# Patient Record
Sex: FEMALE | Race: BLACK OR AFRICAN AMERICAN
[De-identification: names, ages, dates, MRNs, and addresses within clinical notes are randomized per-mention and may not be internally consistent; named-entity substitution may affect disease eponyms.]

---

## 2020-01-22 ENCOUNTER — HOSPITAL ENCOUNTER (EMERGENCY)
Dept: HOSPITAL 74 - FER | Age: 20
Discharge: HOME | End: 2020-01-22
Payer: COMMERCIAL

## 2020-01-22 VITALS — TEMPERATURE: 98.5 F | HEART RATE: 75 BPM | SYSTOLIC BLOOD PRESSURE: 130 MMHG | DIASTOLIC BLOOD PRESSURE: 65 MMHG

## 2020-01-22 VITALS — BODY MASS INDEX: 37.1 KG/M2
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
## 2020-01-22 DIAGNOSIS — Z91.018: ICD-10-CM

## 2020-01-22 DIAGNOSIS — Z91.013: ICD-10-CM

## 2020-01-22 DIAGNOSIS — S61.213A: Primary | ICD-10-CM

## 2020-01-22 PROCEDURE — 0HQGXZZ REPAIR LEFT HAND SKIN, EXTERNAL APPROACH: ICD-10-PCS

## 2020-01-22 NOTE — PDOC
*Physical Exam





- Vital Signs


 Last Vital Signs











Temp Pulse Resp BP Pulse Ox


 


 98.5 F   75   18   130/65   99 


 


 01/22/20 21:01  01/22/20 21:01  01/22/20 21:01  01/22/20 21:01  01/22/20 21:01














ED Progress Note





- Progress Note


Progress Note: 





Care of this patient received from Dr. Jules.


This 19-year-old woman presents with injury to the distal phalanx of her left 

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 

is employed.  While using a sharp knife preparing food, the patient sustained a 

flap type laceration of the radial aspect of the distal phalanx.  Injury is 

nearly full  avulsion of tissue with flap being held on by thin slip of skin.  

Flap, however seems viable on exam.


Left index finger x-ray was performed to evaluate for bony injury: Preliminary 

interpretationno evidence of fracture or other bony injury.


 


Dr. Jules had performed a digital anesthesia block on the finger prior to 

my assuming care of the patient.





Patient had excellent anesthesia of the distal phalanx of the left index finger.


Wound cleansed with Hibiclens/ethanol solution and sterilely draped.  Wound was 

irrigated with 30 mL of sterile normal saline.


Laceration was closed using 8 interrupted sutures of 6-0 nylon.  Skin flap was 

slightly pale at completion of the repair.


Bacitracin ointment and sterile dressing was applied followed by a protective 

gauze tube dressing.  Patient tolerated procedure well





Because of the complex nature of this injury, patient will be started on Keflex 

500 mg 3 times a day for 5 days.  First dose given now in the ER.





Patient was advised that the flap may not ultimately viable but would act as a 

biological dressing at the very least.  She will require a wound check in the 

next few days.  She should not work until that time.  Work documentation will 

be provided for the patient.


Case was discussed with Dr. Goldberg by phone: He will see the patient in his 

office within the next 2 to 3 days.


Meanwhile, the patient has increased pain or swelling in the finger, she should 

return to the ER





Discharge





- Discharge Information


Problems reviewed: Yes


Clinical Impression/Diagnosis: 


Finger laceration


Qualifiers:


 Encounter type: initial encounter Finger: index finger Damage to nail status: 

with damage Foreign body presence: without foreign body Laterality: left 

Qualified Code(s): S61.311A - Laceration without foreign body of left index 

finger with damage to nail, initial encounter





Condition: Stable


Disposition: HOME





- Additional Discharge Information


Prescriptions: 


Cephalexin [Keflex] 500 mg PO TID #15 capsule





- Follow up/Referral


Referrals: 


ON STAFF,NOT [Primary Care Provider] - 


Goldberg,Neal D, MD [Staff Physician] - Call tomorrow





- Patient Discharge Instructions


Patient Printed Discharge Instructions:  How to Care for a Laceration After 

Repair


Additional Instructions: 


Keep original dressing in place and as dry as possible until seen by Dr. Goldberg


Elevate left hand at heart level or above as much as possible


Tylenol/Motrin/Aleve as needed for pain


Keflex 500mg 3X day for 5 days


Call Dr Goldberg's office tomorrow AM to arrange followup appointment within 2-

3 days


No work until seen by Dr Goldberg


Return to ER if you have persistent pain or finger becomes swollen





- Post Discharge Activity


Work/Back to School Note:  Back to Work

## 2020-01-26 ENCOUNTER — HOSPITAL ENCOUNTER (EMERGENCY)
Dept: HOSPITAL 74 - FER | Age: 20
Discharge: HOME | End: 2020-01-26
Payer: COMMERCIAL

## 2020-01-26 VITALS — DIASTOLIC BLOOD PRESSURE: 82 MMHG | HEART RATE: 82 BPM | TEMPERATURE: 97.6 F | SYSTOLIC BLOOD PRESSURE: 133 MMHG

## 2020-01-26 VITALS — BODY MASS INDEX: 36 KG/M2

## 2020-01-26 DIAGNOSIS — Z48.00: Primary | ICD-10-CM

## 2020-01-26 NOTE — PDOC
Documentation entered by Lucy Ellison SCRIBE, acting as scribe for 

Lilly Garcia MD.








Lilly Garcia MD:  This documentation has been prepared by the 

brittibe, Lucy Ellison SCRIBE, under my direction and personally reviewed by 

me in its entirety.  I confirm that the documentation accurately reflects all 

work, treatment, procedures, and medical decision making performed by me.  





Suture Removal/Wound Check HPI





- History of Present Illness


Chief Complaint: Wound


Stated Complaint: WOUND CHECK


History Source: Yes: Patient


Exam Limitations: Yes: No Limitations


Treated at: [facility] ED





- Previous ED Treatment


Type of procedure performed on last visit: Yes: Laceration Repair





- Onset of Previous Treatment


Comment:: 





01/26/20 20:07


Assessment and plan: This is a 19-year-old female who comes in for a wound 

check.  Patient had sutures placed about 3 days ago.  Patient did have a small 

amount of gauze that was stuck to the wound that was





Otherwise wound is healing well there is no evidence of infection.  Patient 

discharged told to return 1 week after the sutures were placed for suture 

removal





Past History





- Travel


Traveled outside of the country in the last 30 days: No


Close contact w/someone who was outside of country & ill: No





- Past Medical History


Allergies/Adverse Reactions: 


 Allergies











Allergy/AdvReac Type Severity Reaction Status Date / Time


 


sesame seed Allergy Intermediate Swelling Verified 01/26/20 19:49


 


shellfish derived Allergy Intermediate Swelling Verified 01/26/20 19:49











Home Medications: 


Ambulatory Orders





Cephalexin [Keflex] 500 mg PO TID #15 capsule 01/22/20 








COPD: No





- Psycho Social/Smoking Cessation Hx


Smoking History: Current every day smoker


Have you smoked in the past 12 months: Yes


Information on smoking cessation initiated: Yes


Hx Alcohol Use: No


Drug/Substance Use Hx: No





Suture Removal/Wound Check PE





- Physical Exam


Laceration/Wound Check Symptoms: reports: Improved


Current Severity Level: None


Maximum Severity Level: None


Pain Localization: None


Location of Laceration/Wound: right: Finger (Middle finger)


Pain Radiation: None





*Review of Systems





- Review of Systems


Constitutional: No: Symptoms Reported, See HPI, Chills, Diaphoresis, Fever, 

Loss of Appetite, Malaise, Night Sweats, Weakness, Weight Stable, Unintentional 

Wgt. Loss, Unexplained wgt Loss, Other


HEENTM: No: Symptoms Reported, See HPI, Eye Pain, Blurred Vision, Tearing, 

Recent change in vision, Double Vision, Cataracts, Ear Pain, Ocular Prothesis, 

Ear Discharge, Nose Pain, Nose Congestion, Tinnitus, Nose Bleeding, Hearing Loss

, Throat Pain, Throat Swelling, Mouth Pain, Dental Problems, Difficulty 

Swallowing, Mouth Swelling, Other


Respiratory: No: Symptoms reported, See HPI, Cough, Orthopnea, Shortness of 

Breath, SOB with Exertion, SOB at Rest, Stridor, Wheezing, Productive cough, 

Hemoptysis, Other


Cardiac (ROS): No: Symptoms Reported, See HPI, Chest Pain, Edema, Irregular 

Heart Rate, Lightheadedness, Palpitations, Syncope, Chest Tightness, Other


ABD/GI: No: Symptoms Reported, See HPI, Abdominal Distended, Abd. Pain w/ 

defecation, Blood Streaked Bowels, Constipated, Diarrhea, Difficulty Swallowing

, Nausea, Poor Appetite, Poor Fluid Intake, Rectal Bleeding, Vomiting, 

Indigestion, Abdominal cramping, Tarry Stools, Other


Musculoskeletal: No: Symptoms Reported, See HPI, Back Pain, Gout, Joint Pain, 

Joint Swelling, Muscle Pain, Muscle Weakness, Neck Pain, Joint Stiffness, Other


Integumentary: No: Symptoms Reported, See HPI, Bruising, Change in Color, 

Change in Hair/Nails, Dryness, Erythema, Flushing, Lesions, Lumps, Pallor, 

Pruritus, Rash, Sweating, Other


Neurological: No: Symptoms reported, See HPI, Headache, Numbness, Paresthesia, 

Pre-Existing Deficit, Seizure, Tingling, Tremors, Weakness, Unsteady Gait, 

Ataxia, Dizziness, Other


Psychiatric: No: Anxiety, Depression, Frequent Crying, Stressors, Sleep Pattern 

Change, Emotional Problems, Mood Swings, Change in Appetite, Other


Endocrine: No: Symptoms Reported, See HPI, Excessive Sweating, Flushing, 

Intolerance to Cold, Intolerance to Heat, Increased Hunger, Increased Thirst, 

Increased Urine, Unexplained Weight Gain, Unexplained Weight Loss, Change in 

Weight, Other





*Physical Exam





- Vital Signs


 Last Vital Signs











Temp Pulse Resp BP Pulse Ox


 


 97.6 F   82   16   133/82   100 


 


 01/26/20 19:51  01/26/20 19:51  01/26/20 19:51  01/26/20 19:51  01/26/20 19:51














Discharge





- Discharge Information


Problems reviewed: Yes


Clinical Impression/Diagnosis: 


 Visit for wound check





Condition: Stable





- Admission


No





- Follow up/Referral





- Patient Discharge Instructions


Additional Instructions: 


Clean the area once a day with a little peroxide reapply some bacitracin and 

can cover with a Band-Aid if working in a dirty environment.





Keep the finger dry.





Return to the emergency department immediately with ANY new, persistent or 

worsening symptoms.





Continue any medications as previously prescribed by your physician.





You should follow up with your primary doctor as soon as possible regarding 

today's emergency department visit.


.


Please make sure your doctor reviews the results of your emergency evaluation.





Thank you for coming to the   Emergency Department today for your care. It was 

a pleasure to see you today. Please note that your evaluation is INCOMPLETE 

until you  follow-up with your doctor. 





- Post Discharge Activity

## 2020-02-03 NOTE — PDOC
Documentation entered by Niurka Swain SCRIBE, acting as scribe for Mike Nava MD.








Mike Nava MD:  This documentation has been prepared by the brittibeWiliam Lincy, SCRIBE, under my direction and personally reviewed by me in its 

entirety.  I confirm that the documentation accurately reflects all work, 

treatment, procedures, and medical decision making performed by me.  





History of Present Illness





- General


Chief Complaint: Injury


Stated Complaint: CUT  LEFT  MID FINGER AT HOME ON KNIFE


Time Seen by Provider: 01/22/20 21:02


History Source: Patient


Exam Limitations: No Limitations





- History of Present Illness


Initial Comments: 


01/22/20 21:39


The patient is a 19-year-old female with no reported past medical history who 

presents to the emergency department with a cut to the left middle finger. The 

patient reports she was cutting chicken when she accidentally cut her left 

middle finger. The patient reports incident occurs around 8:00-8:15 pm. Denies 

numbness, tingling, or loss of sensation. 





Allergies: sesame seed and shellfish derived. 





Review of system: 


CONSTITUTIONAL:


Absent: fever, no chills, no fatigue


EYES:


Absent: visual changes


ENT:


Absent: ear pain, no sore throat


CARDIOVASCULAR:


Absent: chest pain, no palpitations


RESPIRATORY:


Absent: cough, no SOB


GI:


Absent: abdominal pain, no nausea, no vomiting, no constipation, no diarrhea


GENITOURINARY:


Absent: dysuria, no frequency, no hematuria


MUSKULOSKELETAL: +cut to left middle finger. 


Absent: back pain, no arthralgia, no myalgia


SKIN:


Absent: rash


NEURO:


Absent: headache. 





Physical exam: 


GENERAL: Well developed, well nourished. Awake and alert. No acute distress.


EXTREMITY: Partial avulsion of the left 3rd finger, approximately 5 mm in 

diameter, consisting of the skin and soft tissue only. Involves sliver of the  

nail edge, Good coverage of the bone, no exposed bone. 





Digital block with 1% lidocaine plain administered for pain control and to 

facilitate repair





X-ray ordered





Signed out to Dr. Arrieta 10 PM for further evaluation and definitive treatment.





Past History





- Past Medical History


Allergies/Adverse Reactions: 


 Allergies











Allergy/AdvReac Type Severity Reaction Status Date / Time


 


sesame seed Allergy Intermediate Swelling Verified 01/26/20 19:49


 


shellfish derived Allergy Intermediate Swelling Verified 01/26/20 19:49











Home Medications: 


Ambulatory Orders





Cephalexin [Keflex] 500 mg PO TID #15 capsule 01/22/20 











*Physical Exam





- Vital Signs


 Last Vital Signs











Temp Pulse Resp BP Pulse Ox


 


 98.5 F   75   18   130/65   99 


 


 01/22/20 21:01  01/22/20 21:01  01/22/20 21:01  01/22/20 21:01  01/22/20 21:01














ED Treatment Course





- RADIOLOGY


Radiology Studies Ordered: 














 Category Date Time Status


 


 FINGER(S) LEFT [RAD] Stat Radiology  01/22/20 21:49 Completed














- Medications


Given in the ED: 


ED Medications














Discontinued Medications














Generic Name Dose Route Start Last Admin





  Trade Name Azra  PRN Reason Stop Dose Admin


 


Cephalexin HCl  500 mg  01/22/20 22:52  01/22/20 23:17





  Keflex -  PO  01/22/20 22:53  500 mg





  ONCE ONE   Administration





     





     





     





     














Discharge





- Discharge Information


Problems reviewed: Yes


Clinical Impression/Diagnosis: 


Finger laceration


Qualifiers:


 Encounter type: initial encounter Finger: index finger Damage to nail status: 

with damage Foreign body presence: without foreign body Laterality: left 

Qualified Code(s): S61.311A - Laceration without foreign body of left index 

finger with damage to nail, initial encounter





Condition: Stable


Disposition: HOME





- Additional Discharge Information


Prescriptions: 


Cephalexin [Keflex] 500 mg PO TID #15 capsule





- Follow up/Referral


Referrals: 


Goldberg,Neal D, MD [Staff Physician] - Call tomorrow


ON STAFF,NOT [Primary Care Provider] - 





- Patient Discharge Instructions


Patient Printed Discharge Instructions:  How to Care for a Laceration After 

Repair


Additional Instructions: 


Keep original dressing in place and as dry as possible until seen by Dr. Goldberg


Elevate left hand at heart level or above as much as possible


Tylenol/Motrin/Aleve as needed for pain


Keflex 500mg 3X day for 5 days


Call Dr Goldberg's office tomorrow AM to arrange followup appointment within 2-

3 days


No work until seen by Dr Goldberg


Return to ER if you have persistent pain or finger becomes swollen





- Post Discharge Activity


Work/Back to School Note:  Back to Work

## 2022-11-12 ENCOUNTER — HOSPITAL ENCOUNTER (EMERGENCY)
Dept: HOSPITAL 74 - FER | Age: 22
LOS: 1 days | Discharge: HOME | End: 2022-11-13
Payer: COMMERCIAL

## 2022-11-12 VITALS
DIASTOLIC BLOOD PRESSURE: 94 MMHG | RESPIRATION RATE: 18 BRPM | HEART RATE: 88 BPM | SYSTOLIC BLOOD PRESSURE: 142 MMHG | TEMPERATURE: 98 F

## 2022-11-12 VITALS — BODY MASS INDEX: 37.3 KG/M2
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
## 2022-11-12 DIAGNOSIS — S82.401A: Primary | ICD-10-CM
